# Patient Record
Sex: FEMALE | Race: OTHER | Employment: OTHER | URBAN - METROPOLITAN AREA
[De-identification: names, ages, dates, MRNs, and addresses within clinical notes are randomized per-mention and may not be internally consistent; named-entity substitution may affect disease eponyms.]

---

## 2018-03-02 NOTE — PATIENT DISCUSSION
Patient appears to be healing well since trauma involving the left side of her face. Patient will follow up in two months to check for any evidence of enophthalmos.

## 2018-05-04 NOTE — PATIENT DISCUSSION
CT scan shows acut comminuted and medially displaced fracture of the left lamina papyracea. The medial rectus approaches the ethmoid sinus without gross herniation. Orbital floors intact. Explained that everything appears normal today. Do not recommend any intervention at this time. Follow up prn.

## 2018-07-19 NOTE — PATIENT DISCUSSION
MILD DRY EYE, OU: PRESCRIBED REFRESH OPTIVE PRN OU. RECOMMENDS OMEGA-3 FISH OIL WITH [facility] PHYSICIANS APPROVAL. RETURN FOR FOLLOW-UP AS SCHEDULED OR SOONER IF SYMPTOMS WORSEN.

## 2018-07-19 NOTE — PATIENT DISCUSSION
CONJUNCTIVOCHALASIS, OU:  RECOMMEND GOOD QUALITY ARTIFICIAL TEARS. RETURN FOR FOLLOW-UP AS SCHEDULED.

## 2018-07-19 NOTE — PATIENT DISCUSSION
PTERYGIUM, OD :  STABLE. PRESCRIBE ARTIFICIAL TEARS AS NEEDED, DECREASE EXPOSURE AND INCREASE UV PROTECTION.

## 2018-10-09 NOTE — PATIENT DISCUSSION
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 CALL BACK IMMEDIATELY FOR ANY WORSENING SYMPTOMS. RETURN FOR FOLLOW-UP AS SCHEDULED.

## 2018-10-09 NOTE — PATIENT DISCUSSION
VITREOUS FLOATERS, OS:  NO HOLES OR TEARS 360' OU. FLOATERS AND FLASHES PRECAUTIONS REVIEWED WITH PATIENT. PATIENT TO  RETURN FOR FOLLOW-UP AS SCHEDULED.

## 2022-08-24 ENCOUNTER — NEW PATIENT (OUTPATIENT)
Dept: URBAN - METROPOLITAN AREA CLINIC 27 | Facility: CLINIC | Age: 72
End: 2022-08-24

## 2022-08-24 DIAGNOSIS — H04.123: ICD-10-CM

## 2022-08-24 DIAGNOSIS — H52.13: ICD-10-CM

## 2022-08-24 PROCEDURE — 92004 COMPRE OPH EXAM NEW PT 1/>: CPT

## 2022-08-24 PROCEDURE — 92015 DETERMINE REFRACTIVE STATE: CPT

## 2022-08-24 ASSESSMENT — VISUAL ACUITY
OS_SC: J1+
OS_CC: 20/20-1
OS_CC: J2
OS_SC: 20/200+1
OD_SC: 20/200-1
OD_SC: J18

## 2022-08-24 ASSESSMENT — TONOMETRY
OS_IOP_MMHG: 20
OD_IOP_MMHG: 11